# Patient Record
Sex: MALE | Race: BLACK OR AFRICAN AMERICAN | NOT HISPANIC OR LATINO | URBAN - METROPOLITAN AREA
[De-identification: names, ages, dates, MRNs, and addresses within clinical notes are randomized per-mention and may not be internally consistent; named-entity substitution may affect disease eponyms.]

---

## 2022-07-09 ENCOUNTER — HOSPITAL ENCOUNTER (EMERGENCY)
Facility: MEDICAL CENTER | Age: 42
End: 2022-07-09
Attending: EMERGENCY MEDICINE
Payer: COMMERCIAL

## 2022-07-09 VITALS
TEMPERATURE: 97.8 F | SYSTOLIC BLOOD PRESSURE: 158 MMHG | WEIGHT: 195.33 LBS | OXYGEN SATURATION: 99 % | BODY MASS INDEX: 29.6 KG/M2 | DIASTOLIC BLOOD PRESSURE: 92 MMHG | RESPIRATION RATE: 18 BRPM | HEART RATE: 66 BPM | HEIGHT: 68 IN

## 2022-07-09 DIAGNOSIS — R21 RASH: ICD-10-CM

## 2022-07-09 PROCEDURE — 700102 HCHG RX REV CODE 250 W/ 637 OVERRIDE(OP): Performed by: EMERGENCY MEDICINE

## 2022-07-09 PROCEDURE — 99283 EMERGENCY DEPT VISIT LOW MDM: CPT

## 2022-07-09 PROCEDURE — A9270 NON-COVERED ITEM OR SERVICE: HCPCS | Performed by: EMERGENCY MEDICINE

## 2022-07-09 RX ORDER — DIPHENHYDRAMINE HCL 25 MG
50 TABLET ORAL ONCE
Status: COMPLETED | OUTPATIENT
Start: 2022-07-09 | End: 2022-07-09

## 2022-07-09 RX ORDER — PERMETHRIN 50 MG/G
CREAM TOPICAL
Qty: 30 G | Refills: 0 | Status: SHIPPED | OUTPATIENT
Start: 2022-07-09

## 2022-07-09 RX ORDER — PERMETHRIN 50 MG/G
CREAM TOPICAL ONCE
Status: COMPLETED | OUTPATIENT
Start: 2022-07-09 | End: 2022-07-09

## 2022-07-09 RX ORDER — DIPHENHYDRAMINE HCL 25 MG
25 CAPSULE ORAL EVERY 6 HOURS PRN
Qty: 30 CAPSULE | Refills: 0 | Status: SHIPPED | OUTPATIENT
Start: 2022-07-09

## 2022-07-09 RX ADMIN — DIPHENHYDRAMINE HYDROCHLORIDE 50 MG: 25 TABLET ORAL at 17:26

## 2022-07-09 RX ADMIN — PERMETHRIN CREAM 5% W/W: 50 CREAM TOPICAL at 17:59

## 2022-07-09 NOTE — ED TRIAGE NOTES
"Chief Complaint   Patient presents with   • Skin Lesion     Just [place] an apt. \"[place]\" he said its not hygenic there and is concerned for bug bites/bed bugs. Pt states itching and swelling to bite area       "

## 2022-07-10 NOTE — DISCHARGE PLANNING
ER JYOTI met with Pt to offer low income housing resources.  Pt shared that he was staying in a low income apartment and due to an incident he had to leave abruptly. He stated that he is now staying in a weekly hotel, but is not planning on staying their long.  He shared that he has talked to the management at the hotel and they are talking about doing treatments to his room.      This SW provided Pt with the low income housing list.  Pt shared that he has already been to the [place] authority and has been approved for [facility].      Pt also shared that he goes to [facility] for his PCP care and will follow up with them.

## 2022-07-10 NOTE — ED PROVIDER NOTES
"ED Provider Note    CHIEF COMPLAINT  Chief Complaint   Patient presents with   • Skin Lesion     Just moved [place] an apt. \"[place]\" he said its not hygenic there and is concerned for bug bites/bed bugs. Pt states itching and swelling to bite area       HPI  Fam Jacob is a 42 y.o. male who presents with a chief complaint of itchy rash that has been ongoing for a little less than a week.  Patient moved into a local motel and 1 to 2 days after moving and developed an itchy rash all over his body.  He has not tried any medication for the symptoms.  The rash is so itchy he is having trouble sleeping.  He denies any fevers, difficulty breathing, nausea, vomiting.    REVIEW OF SYSTEMS  See HPI for further details.  Itchy rash.  All other systems are negative.     PAST MEDICAL HISTORY       SOCIAL HISTORY  Social History     Tobacco Use   • Smoking status: Not on file   • Smokeless tobacco: Not on file   Substance and Sexual Activity   • Alcohol use: Not on file   • Drug use: Not on file   • Sexual activity: Not on file       SURGICAL HISTORY  patient denies any surgical history    CURRENT MEDICATIONS  Home Medications    **Home medications have not yet been reviewed for this encounter**         ALLERGIES  No Known Allergies    PHYSICAL EXAM  VITAL SIGNS: BP (!) 149/95   Pulse (!) 59   Temp 36.8 °C (98.2 °F) (Temporal)   Resp 18   Ht 1.727 m (5' 8\")   Wt 88.6 kg (195 lb 5.2 oz)   SpO2 100%   BMI 29.70 kg/m²    Pulse ox interpretation: I interpret this pulse ox as normal.  Constitutional: Alert in no apparent distress.  HENT: Normocephalic, atraumatic, bilateral external ears normal. Mucous membranes moist. Nose normal.  No oral lesions.  Eyes: Pupils are equal and reactive. Conjunctiva normal, non-icteric.   Heart: Regular rate and rythm, no murmurs.    Lungs: Clear to auscultation bilaterally.  Skin: Warm, dry.  Raised, erythematous areas over torso and all 4 extremities without associated " fluctuance, vesicles, or drainage.  Neurologic: Alert, grossly non-focal.   Psychiatric: Affect normal, judgment normal, mood normal, appears appropriate and not intoxicated.     COURSE & MEDICAL DECISION MAKING  Pertinent Labs & Imaging studies reviewed. (See chart for details)  This is a 42-year-old gentleman currently living in a motel who is here with a pruritic rash over his entire body likely due to bedbugs or potentially scabies.  There is no worsening of the rash in the intertriginous regions and the rash is over the entire body.  No skin sloughing.  No mucosal lesions to suggest SJS/TN.  No difficulty breathing or wheezes.  No stridor.  He is tolerating secretions well.  This does not appear to be an allergic reaction.  Patient unable to purchase or pickup prescriptions so permethrin will be applied here. He was instructed to rinse it off in 14 hours. I have recommended benadryl for the itching. He was seen by social work to discuss housing options. Discharged in good and stable condition with strict return precautions.    The patient will return for worsening symptoms and is stable at the time of discharge. The patient verbalizes understanding and will comply.    FINAL IMPRESSION  1. Rash  permethrin (ELIMITE) 5 % Cream    diphenhydrAMINE (BENADRYL) 25 MG capsule       Electronically signed by: Kush Weathers M.D., 7/9/2022 5:14 PM

## 2022-07-10 NOTE — ED NOTES
Discharge instructions provided, pt verbalizes understanding and has no questions. Vital signs stable, pt ambulated out of ER with steady gate.      HTN (hypertension) Seizure

## 2022-07-10 NOTE — DISCHARGE INSTRUCTIONS
You were seen in the ER for a rash that could be due to bed bugs. I have given you a prescription for cream to put on to cure the rash. I also gave you a prescription for benadryl for the itching. Please take it as directed. You cannot drink alcohol or drive after taking the medication as it will make you sleepy. Please follow up with a primary care physician next week for recheck. Return with any new or worsening symptoms. I hope you feel better soon!

## 2022-10-29 ENCOUNTER — APPOINTMENT (OUTPATIENT)
Dept: RADIOLOGY | Facility: MEDICAL CENTER | Age: 42
End: 2022-10-29
Attending: EMERGENCY MEDICINE
Payer: COMMERCIAL

## 2022-10-29 ENCOUNTER — HOSPITAL ENCOUNTER (EMERGENCY)
Facility: MEDICAL CENTER | Age: 42
End: 2022-10-29
Attending: EMERGENCY MEDICINE
Payer: COMMERCIAL

## 2022-10-29 VITALS
HEART RATE: 78 BPM | BODY MASS INDEX: 29.77 KG/M2 | DIASTOLIC BLOOD PRESSURE: 92 MMHG | RESPIRATION RATE: 18 BRPM | OXYGEN SATURATION: 96 % | WEIGHT: 196.43 LBS | HEIGHT: 68 IN | TEMPERATURE: 98.4 F | SYSTOLIC BLOOD PRESSURE: 160 MMHG

## 2022-10-29 DIAGNOSIS — M25.512 ACUTE PAIN OF LEFT SHOULDER: ICD-10-CM

## 2022-10-29 PROCEDURE — 99283 EMERGENCY DEPT VISIT LOW MDM: CPT | Mod: 25

## 2022-10-29 PROCEDURE — 73030 X-RAY EXAM OF SHOULDER: CPT | Mod: LT

## 2022-10-29 PROCEDURE — 20610 DRAIN/INJ JOINT/BURSA W/O US: CPT

## 2022-10-29 RX ORDER — NAPROXEN 500 MG/1
500 TABLET ORAL 2 TIMES DAILY WITH MEALS
Qty: 60 TABLET | Refills: 0 | Status: SHIPPED | OUTPATIENT
Start: 2022-10-29 | End: 2022-11-03

## 2022-10-29 NOTE — ED NOTES
Pt awake, sitting upright in bed, speaking without signs of distress. States he was sleeping in chair when he awakened he had pain to his left shoulder. No bruising noted. No crepitis noted. No open wounds noted.

## 2022-10-29 NOTE — ED PROVIDER NOTES
ED Provider Note    CHIEF COMPLAINT  Chief Complaint   Patient presents with    Shoulder Pain     LT shoulder pain x3 weeks. No known injury. Reports he feels inflamed. Hurts worse after waking.      HPI  Patient is a 42-year-old male with a history of prior cataract surgery and ankle surgery who presents emergency room for chronic left-sided shoulder pain.  He says it is been going on over the course of a month that has been more exacerbated over the last several weeks while he has been his job.  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  He has not been seen he has previously and now presents with worsening pain and discomfort.  He has not had x-rays before.  He denies any traumatic injuries, he has not been having any numbness or tingling, denies any skin changes and has not noticed any weakness or other overall changes to the extremity.    PPE Note: I personally donned full PPE for all patient encounters during this visit, including being clean-shaven with an N95 respirator mask, gloves, and goggles.     REVIEW OF SYSTEMS  See HPI for further details. All other systems are negative.     PAST MEDICAL HISTORY       SOCIAL HISTORY  Social History     Tobacco Use    Smoking status: Former     Types: Cigarettes    Smokeless tobacco: Never   Substance and Sexual Activity    Alcohol use: Yes     Comment: occ    Drug use: Not Currently    Sexual activity: Not on file       SURGICAL HISTORY  patient denies any surgical history    CURRENT MEDICATIONS  Home Medications       Reviewed by Ankit Aguilar R.N. (Registered Nurse) on 10/29/22 at 1618  Med List Status: Partial     Medication Last Dose Status   diphenhydrAMINE (BENADRYL) 25 MG capsule  Active   permethrin (ELIMITE) 5 % Cream  Active                  ALLERGIES  No Known Allergies    PHYSICAL  "EXAM  VITAL SIGNS: BP (!) 160/92   Pulse 78   Temp 36.9 °C (98.4 °F) (Temporal)   Resp 18   Ht 1.727 m (5' 8\")   Wt 89.1 kg (196 lb 6.9 oz)   SpO2 96%   BMI 29.87 kg/m²   Pulse ox interpretation: I interpret this pulse ox as normal.  Genl: M sitting in chair comfortably, speaking clearly, appears in no acute distress   Head: NC/AT   ENT: Mucous membranes moist, posterior pharynx clear, uvula midline, nares patent bilaterally   Pulmonary: Lungs are clear to auscultation bilaterally  CV:  RRR, no murmur appreciated, pulses 2+ in both upper and lower extremities,  Abdomen: soft, NT/ND; no rebound/guarding  Musculoskeletal: Pain free ROM of the neck. Moving upper and lower extremities and spontaneous in coordinated fashion  Right Upper Extremity  - Skin: No abrasions, no lacerations, no ecchymosis.  Pain easily elicited with palpation of the supraspinatus, with palpation of the greater tuberosity of the proximal humerus  - Motor: Limited ROM at shoulder due to pain, Full elbow, wrist; 5/5 wrist flexion/extension, thumb IP joint flexion/extension (AIN/PIN), abduction/adduction (ulnar) all intact  - Sensation intact to median/ulnar/radial nerves  - 2+ radial pulse, < 2 sec cap refill x 5 digits    DIAGNOSTIC STUDIES / PROCEDURES    LABS  Labs Reviewed - No data to display    RADIOLOGY  DX-SHOULDER 2+ LEFT   Final Result      No fracture or dislocation of LEFT shoulder.        COURSE & MEDICAL DECISION MAKING  Pertinent Labs & Imaging studies reviewed. (See chart for details)    DDX:  Musculoskeletal strain, bursitis, fracture, subluxation, AC joint separation, overuse injury/capsulitis    MDM  Initial evaluation at 1632:  Patient is seen for symptoms as described above.  He does have some impediment onset with high likelihood of overuse injury versus 6 tear based on his areas of regional tenderness and manipulation.  Acutely patient has not had imaging and this was obtained and thankfully shows no evidence of acute " fracture, subluxation or AC joint separation.  And extensive discussion regarding the likely exacerbating factors and the need for ongoing anti-inflammatory medications.  He is amenable to doing a small joint injection with lidocaine for immediate pain relief I would recommend outpatient follow-up with orthopedics in a nonemergent manner    Arthrocentesis Procedure  Indication: Joint pain  Consent: The patient was counseled regarding the procedure, it's indications, risks, potential complications and alternatives and any questions were answered. Consent was obtained.  Procedure: The left shoulder was positioned appropriately and the landmarks were identified.  Local anesthesia was obtained by infiltration using 0.5% Bupivacaine with epinephrine.  The area was then prepped and draped in the usual sterile fashion.  A needle was then introduced into the joint space at which point No fluid was able to be aspirated.  A joint injection was performed using 4.0 cc of lidocaine/bupivicane.  A sterile dressing was then applied to the site.  The patient tolerated the procedure well.  Complications: None    FINAL IMPRESSION  Visit Diagnoses     ICD-10-CM   1. Acute pain of left shoulder  M25.512     Electronically signed by: Rolando Christopher M.D., 10/29/2022 4:32 PM

## 2022-10-29 NOTE — ED TRIAGE NOTES
"Chief Complaint   Patient presents with    Shoulder Pain     LT shoulder pain x3 weeks. No known injury. Reports he feels inflamed. Hurts worse after waking.      Ambulatory to triage for above. No distress noted. CMS intact.    BP (!) 148/91   Pulse 73   Temp 37.2 °C (98.9 °F) (Oral)   Resp 16   Ht 1.727 m (5' 8\")   Wt 89.1 kg (196 lb 6.9 oz)   SpO2 99%   BMI 29.87 kg/m²     "

## 2022-10-30 NOTE — ED NOTES
Pt awake, speaking without signs of distress. States understanding of discharge instructions. States understanding of sling instructions.

## 2023-02-14 ENCOUNTER — HOSPITAL ENCOUNTER (EMERGENCY)
Facility: MEDICAL CENTER | Age: 43
End: 2023-02-14
Attending: EMERGENCY MEDICINE
Payer: COMMERCIAL

## 2023-02-14 VITALS
SYSTOLIC BLOOD PRESSURE: 153 MMHG | OXYGEN SATURATION: 94 % | WEIGHT: 212.3 LBS | RESPIRATION RATE: 18 BRPM | DIASTOLIC BLOOD PRESSURE: 87 MMHG | TEMPERATURE: 98.4 F | HEART RATE: 82 BPM | HEIGHT: 68 IN | BODY MASS INDEX: 32.18 KG/M2

## 2023-02-14 DIAGNOSIS — M79.651 PAIN OF RIGHT THIGH: ICD-10-CM

## 2023-02-14 LAB
ALBUMIN SERPL BCP-MCNC: 4.5 G/DL (ref 3.2–4.9)
ALBUMIN/GLOB SERPL: 1.2 G/DL
ALP SERPL-CCNC: 62 U/L (ref 30–99)
ALT SERPL-CCNC: 19 U/L (ref 2–50)
ANION GAP SERPL CALC-SCNC: 11 MMOL/L (ref 7–16)
AST SERPL-CCNC: 27 U/L (ref 12–45)
BASOPHILS # BLD AUTO: 1 % (ref 0–1.8)
BASOPHILS # BLD: 0.06 K/UL (ref 0–0.12)
BILIRUB SERPL-MCNC: 0.5 MG/DL (ref 0.1–1.5)
BUN SERPL-MCNC: 13 MG/DL (ref 8–22)
CALCIUM ALBUM COR SERPL-MCNC: 9.1 MG/DL (ref 8.5–10.5)
CALCIUM SERPL-MCNC: 9.5 MG/DL (ref 8.5–10.5)
CHLORIDE SERPL-SCNC: 108 MMOL/L (ref 96–112)
CK SERPL-CCNC: 239 U/L (ref 0–154)
CO2 SERPL-SCNC: 25 MMOL/L (ref 20–33)
CREAT SERPL-MCNC: 1.01 MG/DL (ref 0.5–1.4)
CRP SERPL HS-MCNC: <0.3 MG/DL (ref 0–0.75)
EOSINOPHIL # BLD AUTO: 0.32 K/UL (ref 0–0.51)
EOSINOPHIL NFR BLD: 5.2 % (ref 0–6.9)
ERYTHROCYTE [DISTWIDTH] IN BLOOD BY AUTOMATED COUNT: 46.7 FL (ref 35.9–50)
GFR SERPLBLD CREATININE-BSD FMLA CKD-EPI: 95 ML/MIN/1.73 M 2
GLOBULIN SER CALC-MCNC: 3.8 G/DL (ref 1.9–3.5)
GLUCOSE SERPL-MCNC: 105 MG/DL (ref 65–99)
HCT VFR BLD AUTO: 48.7 % (ref 42–52)
HGB BLD-MCNC: 15.9 G/DL (ref 14–18)
IMM GRANULOCYTES # BLD AUTO: 0.02 K/UL (ref 0–0.11)
IMM GRANULOCYTES NFR BLD AUTO: 0.3 % (ref 0–0.9)
LYMPHOCYTES # BLD AUTO: 1.12 K/UL (ref 1–4.8)
LYMPHOCYTES NFR BLD: 18.1 % (ref 22–41)
MCH RBC QN AUTO: 28.3 PG (ref 27–33)
MCHC RBC AUTO-ENTMCNC: 32.6 G/DL (ref 33.7–35.3)
MCV RBC AUTO: 86.7 FL (ref 81.4–97.8)
MONOCYTES # BLD AUTO: 0.41 K/UL (ref 0–0.85)
MONOCYTES NFR BLD AUTO: 6.6 % (ref 0–13.4)
NEUTROPHILS # BLD AUTO: 4.26 K/UL (ref 1.82–7.42)
NEUTROPHILS NFR BLD: 68.8 % (ref 44–72)
NRBC # BLD AUTO: 0 K/UL
NRBC BLD-RTO: 0 /100 WBC
PLATELET # BLD AUTO: 181 K/UL (ref 164–446)
PMV BLD AUTO: 10.2 FL (ref 9–12.9)
POTASSIUM SERPL-SCNC: 4.2 MMOL/L (ref 3.6–5.5)
PROT SERPL-MCNC: 8.3 G/DL (ref 6–8.2)
RBC # BLD AUTO: 5.62 M/UL (ref 4.7–6.1)
SODIUM SERPL-SCNC: 144 MMOL/L (ref 135–145)
WBC # BLD AUTO: 6.2 K/UL (ref 4.8–10.8)

## 2023-02-14 PROCEDURE — 82550 ASSAY OF CK (CPK): CPT

## 2023-02-14 PROCEDURE — 99283 EMERGENCY DEPT VISIT LOW MDM: CPT

## 2023-02-14 PROCEDURE — 85025 COMPLETE CBC W/AUTO DIFF WBC: CPT

## 2023-02-14 PROCEDURE — 86140 C-REACTIVE PROTEIN: CPT

## 2023-02-14 PROCEDURE — 80053 COMPREHEN METABOLIC PANEL: CPT

## 2023-02-14 PROCEDURE — 36415 COLL VENOUS BLD VENIPUNCTURE: CPT

## 2023-02-14 NOTE — ED TRIAGE NOTES
Ambulates to triage with a limp  Chief Complaint   Patient presents with    Leg Pain     R upper leg outer thigh x1 month, pain, redness and swelling. Pt said he woke up one morning with the swelling     Denies any trauma, has been taking advil for the pain.

## 2023-02-15 NOTE — DISCHARGE INSTRUCTIONS
2 tablets of Motrin and then 4 hours later 2 tablets of Tylenol and stay on this regimen for the next 24 to 48 hours.  Stay well-hydrated.    Your blood pressure is elevated in the emergency department and this needs to be followed up in 10 to 14 days with a primary care provider.

## 2023-02-15 NOTE — ED PROVIDER NOTES
"ED Provider Note    CHIEF COMPLAINT  Chief Complaint   Patient presents with    Leg Pain     R upper leg outer thigh x1 month, pain, redness and swelling. Pt said he woke up one morning with the swelling         HPI/ROS    Fam Jacob is a 43 y.o. male who presents with right thigh pain.  The patient states that he had pain to his right upper and lateral aspect of his thigh over the last month.  He does not remember any direct trauma.  He states that it feels swollen.  He denies IV drug abuse.  He has not had any associated fevers.  He has not had any vomiting or diarrhea.  He does not have any functional loss of the right lower extremity.    PAST MEDICAL HISTORY       SURGICAL HISTORY   has a past surgical history that includes cataract extraction with iol and hernia repair.    FAMILY HISTORY  History reviewed. No pertinent family history.    SOCIAL HISTORY  Social History     Tobacco Use    Smoking status: Former     Types: Cigarettes    Smokeless tobacco: Never   Vaping Use    Vaping Use: Never used   Substance and Sexual Activity    Alcohol use: Yes     Comment: occ    Drug use: Not Currently    Sexual activity: Not on file       CURRENT MEDICATIONS  Home Medications       Reviewed by Lynnette Alcala R.N. (Registered Nurse) on 02/14/23 at 1510  Med List Status: Partial     Medication Last Dose Status   diphenhydrAMINE (BENADRYL) 25 MG capsule  Active   permethrin (ELIMITE) 5 % Cream  Active                    ALLERGIES  No Known Allergies    PHYSICAL EXAM  VITAL SIGNS: BP (!) 164/92   Pulse 95   Temp 35.9 °C (96.7 °F) (Temporal)   Resp 16   Ht 1.727 m (5' 8\")   Wt 96.3 kg (212 lb 4.9 oz)   SpO2 96%   BMI 32.28 kg/m²    General the patient does not appear toxic    Extremities patient does not have any scale deformities to the right lower extremity.  Does have some pain along the lateral aspect of the femur as well as anteriorly but the compartments are soft.  He has good distal pulses to the " bilateral lower extremities.    Skin no erythema nor induration appreciated    Neurovascular semination is grossly intact to the right lower extremity      LABS  Results for orders placed or performed during the hospital encounter of 02/14/23   CBC WITH DIFFERENTIAL   Result Value Ref Range    WBC 6.2 4.8 - 10.8 K/uL    RBC 5.62 4.70 - 6.10 M/uL    Hemoglobin 15.9 14.0 - 18.0 g/dL    Hematocrit 48.7 42.0 - 52.0 %    MCV 86.7 81.4 - 97.8 fL    MCH 28.3 27.0 - 33.0 pg    MCHC 32.6 (L) 33.7 - 35.3 g/dL    RDW 46.7 35.9 - 50.0 fL    Platelet Count 181 164 - 446 K/uL    MPV 10.2 9.0 - 12.9 fL    Neutrophils-Polys 68.80 44.00 - 72.00 %    Lymphocytes 18.10 (L) 22.00 - 41.00 %    Monocytes 6.60 0.00 - 13.40 %    Eosinophils 5.20 0.00 - 6.90 %    Basophils 1.00 0.00 - 1.80 %    Immature Granulocytes 0.30 0.00 - 0.90 %    Nucleated RBC 0.00 /100 WBC    Neutrophils (Absolute) 4.26 1.82 - 7.42 K/uL    Lymphs (Absolute) 1.12 1.00 - 4.80 K/uL    Monos (Absolute) 0.41 0.00 - 0.85 K/uL    Eos (Absolute) 0.32 0.00 - 0.51 K/uL    Baso (Absolute) 0.06 0.00 - 0.12 K/uL    Immature Granulocytes (abs) 0.02 0.00 - 0.11 K/uL    NRBC (Absolute) 0.00 K/uL   Comp Metabolic Panel   Result Value Ref Range    Sodium 144 135 - 145 mmol/L    Potassium 4.2 3.6 - 5.5 mmol/L    Chloride 108 96 - 112 mmol/L    Co2 25 20 - 33 mmol/L    Anion Gap 11.0 7.0 - 16.0    Glucose 105 (H) 65 - 99 mg/dL    Bun 13 8 - 22 mg/dL    Creatinine 1.01 0.50 - 1.40 mg/dL    Calcium 9.5 8.5 - 10.5 mg/dL    AST(SGOT) 27 12 - 45 U/L    ALT(SGPT) 19 2 - 50 U/L    Alkaline Phosphatase 62 30 - 99 U/L    Total Bilirubin 0.5 0.1 - 1.5 mg/dL    Albumin 4.5 3.2 - 4.9 g/dL    Total Protein 8.3 (H) 6.0 - 8.2 g/dL    Globulin 3.8 (H) 1.9 - 3.5 g/dL    A-G Ratio 1.2 g/dL   CRP QUANTITIVE (NON-CARDIAC)   Result Value Ref Range    Stat C-Reactive Protein <0.30 0.00 - 0.75 mg/dL   CREATINE KINASE   Result Value Ref Range    CPK Total 239 (H) 0 - 154 U/L   CORRECTED CALCIUM   Result  Value Ref Range    Correct Calcium 9.1 8.5 - 10.5 mg/dL   ESTIMATED GFR   Result Value Ref Range    GFR (CKD-EPI) 95 >60 mL/min/1.73 m 2         COURSE & MEDICAL DECISION MAKING  This a 43-year-old gentleman who presents the emergency department with pain to the lateral anterior aspect of his right thigh.  I do not have a clear source.  Clinically do not appreciate any evidence of an infectious etiology.  He does not have any asymmetry and swelling to support a DVT.  He is has good distal pulses therefore an arterial insufficiency etiology would be unlikely.  The patient's CRP is less than 0.3 which is comforting he does not have a leukocytosis.  He does have a slight elevation of his total CK consistent with some myositis.  This could be from a viral source.  He has not had any excessive exercise.  The patient does not appear toxic and will attempt outpatient management with oral hydration and anti-inflammatories.  I would like the patient to recheck in 48 hours if he is not better.  He is aware his blood pressure is elevated and this needs to be rechecked on an outpatient basis in 7 to 10 days.  FINAL DIAGNOSIS  1.  Right thigh pain  2.  Hypertension    Disposition  The patient will be discharged in stable condition       Electronically signed by: Nilesh Myers M.D., 2/14/2023 4:26 PM

## 2024-06-14 ENCOUNTER — HOSPITAL ENCOUNTER (EMERGENCY)
Facility: MEDICAL CENTER | Age: 44
End: 2024-06-14
Attending: STUDENT IN AN ORGANIZED HEALTH CARE EDUCATION/TRAINING PROGRAM
Payer: COMMERCIAL

## 2024-06-14 ENCOUNTER — APPOINTMENT (OUTPATIENT)
Dept: RADIOLOGY | Facility: MEDICAL CENTER | Age: 44
End: 2024-06-14
Attending: STUDENT IN AN ORGANIZED HEALTH CARE EDUCATION/TRAINING PROGRAM
Payer: COMMERCIAL

## 2024-06-14 VITALS
HEART RATE: 78 BPM | SYSTOLIC BLOOD PRESSURE: 148 MMHG | BODY MASS INDEX: 31.27 KG/M2 | WEIGHT: 206.35 LBS | DIASTOLIC BLOOD PRESSURE: 77 MMHG | RESPIRATION RATE: 18 BRPM | TEMPERATURE: 98 F | OXYGEN SATURATION: 98 % | HEIGHT: 68 IN

## 2024-06-14 DIAGNOSIS — M25.561 ACUTE PAIN OF RIGHT KNEE: ICD-10-CM

## 2024-06-14 PROCEDURE — 73562 X-RAY EXAM OF KNEE 3: CPT | Mod: RT

## 2024-06-14 PROCEDURE — 700102 HCHG RX REV CODE 250 W/ 637 OVERRIDE(OP): Mod: UD | Performed by: STUDENT IN AN ORGANIZED HEALTH CARE EDUCATION/TRAINING PROGRAM

## 2024-06-14 PROCEDURE — 99283 EMERGENCY DEPT VISIT LOW MDM: CPT | Mod: EDC

## 2024-06-14 PROCEDURE — A9270 NON-COVERED ITEM OR SERVICE: HCPCS | Mod: UD | Performed by: STUDENT IN AN ORGANIZED HEALTH CARE EDUCATION/TRAINING PROGRAM

## 2024-06-14 RX ORDER — ACETAMINOPHEN 325 MG/1
650 TABLET ORAL ONCE
Status: COMPLETED | OUTPATIENT
Start: 2024-06-14 | End: 2024-06-14

## 2024-06-14 RX ORDER — IBUPROFEN 600 MG/1
600 TABLET ORAL ONCE
Status: COMPLETED | OUTPATIENT
Start: 2024-06-14 | End: 2024-06-14

## 2024-06-14 RX ADMIN — IBUPROFEN 600 MG: 600 TABLET, FILM COATED ORAL at 18:20

## 2024-06-14 RX ADMIN — ACETAMINOPHEN 650 MG: 325 TABLET, FILM COATED ORAL at 18:20

## 2024-06-14 ASSESSMENT — FIBROSIS 4 INDEX: FIB4 SCORE: 1.47

## 2024-06-14 ASSESSMENT — PAIN DESCRIPTION - PAIN TYPE: TYPE: ACUTE PAIN

## 2024-06-15 NOTE — DISCHARGE INSTRUCTIONS
You are seen in the emergency department for right-sided knee pain.  You likely have a meniscus injury potentially an injury to your medial collateral ligament.    Please take Tylenol ibuprofen for the next several days.  You can continue to weight-bear on your right leg.  Please call the number below to schedule an appointment with orthopedic surgery    [facility]  [address] [place] [phone]

## 2024-06-15 NOTE — ED PROVIDER NOTES
"ED Provider Note    CHIEF COMPLAINT  Chief Complaint   Patient presents with    Knee Pain     C/o right medial knee pain and swelling x 1 week.  Denies known injury.         EXTERNAL RECORDS REVIEWED  No external notes available    HPI/ROS  LIMITATION TO HISTORY   Select: : None  OUTSIDE HISTORIAN(S):  none    Famlorraine Jacob is a 44 y.o. male with no past medical history presented to the emergency department for right-sided knee pain.  Patient says that symptoms have been ongoing for last 2 weeks.  Says that he was helping lift heavy boxes for a friend that was moving, thinks he might of twisted his knee 2 weeks ago.  Since then has been taking Tylenol and applying IcyHot.  Pain is on the medial aspect of the knee.  No prior knee injury.  He denies any trauma to the knee.  Did not notice any popping or severe pain or any specific inciting event.  No other injuries    PAST MEDICAL HISTORY   has a past medical history of Patient denies medical problems.    SURGICAL HISTORY   has a past surgical history that includes cataract extraction with iol and hernia repair.    FAMILY HISTORY  History reviewed. No pertinent family history.    SOCIAL HISTORY  Social History     Tobacco Use    Smoking status: Former     Types: Cigarettes    Smokeless tobacco: Never   Vaping Use    Vaping status: Never Used   Substance and Sexual Activity    Alcohol use: Not Currently     Comment: occ    Drug use: Not Currently    Sexual activity: Not on file       CURRENT MEDICATIONS  Home Medications       Reviewed by Petty Medina R.N. (Registered Nurse) on 06/14/24 at 1734  Med List Status: Not Addressed     Medication Last Dose Status   diphenhydrAMINE (BENADRYL) 25 MG capsule  Active   permethrin (ELIMITE) 5 % Cream  Active                    ALLERGIES  No Known Allergies    PHYSICAL EXAM  VITAL SIGNS: BP (!) 148/77   Pulse 78   Temp 36.7 °C (98 °F) (Temporal)   Resp 18   Ht 1.727 m (5' 8\")   Wt 93.6 kg (206 lb 5.6 oz)   SpO2 " 98%   BMI 31.38 kg/m²    General: Well- appearing , non-toxic, no acute distress  Neuro: oriented x 3, moving all extremities.   HEENT:   - Head: Normocephalic, atraumatic  - Eyes: PERRL  - Ears/Nose: normal external nose and ears  - Mouth: Normal external exam  Resp: no increased work of breathing  CV: Perfusing well  Abd: Not vomiting, no apparent abdominal discomfort  Extremities:   Right lower extremity: Tenderness palpation along the medial joint line of the right knee.  He has no effusion.  His knee is stable on anterior posterior drawer, varus and valgus stress testing.  2+ PT pulse.  Sensation tact light touch.  Dorsiflexion plantarflexion of the great toe, foot about the ankle remains intact.  No overlying erythema induration about the knee.  Skin: Not diaphoretic  Psych: lucid and conversational         DIAGNOSTIC STUDIES / PROCEDURES    EKG  My independent EKG interpretation:  No results found for this or any previous visit.    LABS  Results for orders placed or performed during the hospital encounter of 02/14/23   CBC WITH DIFFERENTIAL   Result Value Ref Range    WBC 6.2 4.8 - 10.8 K/uL    RBC 5.62 4.70 - 6.10 M/uL    Hemoglobin 15.9 14.0 - 18.0 g/dL    Hematocrit 48.7 42.0 - 52.0 %    MCV 86.7 81.4 - 97.8 fL    MCH 28.3 27.0 - 33.0 pg    MCHC 32.6 (L) 33.7 - 35.3 g/dL    RDW 46.7 35.9 - 50.0 fL    Platelet Count 181 164 - 446 K/uL    MPV 10.2 9.0 - 12.9 fL    Neutrophils-Polys 68.80 44.00 - 72.00 %    Lymphocytes 18.10 (L) 22.00 - 41.00 %    Monocytes 6.60 0.00 - 13.40 %    Eosinophils 5.20 0.00 - 6.90 %    Basophils 1.00 0.00 - 1.80 %    Immature Granulocytes 0.30 0.00 - 0.90 %    Nucleated RBC 0.00 /100 WBC    Neutrophils (Absolute) 4.26 1.82 - 7.42 K/uL    Lymphs (Absolute) 1.12 1.00 - 4.80 K/uL    Monos (Absolute) 0.41 0.00 - 0.85 K/uL    Eos (Absolute) 0.32 0.00 - 0.51 K/uL    Baso (Absolute) 0.06 0.00 - 0.12 K/uL    Immature Granulocytes (abs) 0.02 0.00 - 0.11 K/uL    NRBC (Absolute) 0.00 K/uL    Comp Metabolic Panel   Result Value Ref Range    Sodium 144 135 - 145 mmol/L    Potassium 4.2 3.6 - 5.5 mmol/L    Chloride 108 96 - 112 mmol/L    Co2 25 20 - 33 mmol/L    Anion Gap 11.0 7.0 - 16.0    Glucose 105 (H) 65 - 99 mg/dL    Bun 13 8 - 22 mg/dL    Creatinine 1.01 0.50 - 1.40 mg/dL    Calcium 9.5 8.5 - 10.5 mg/dL    AST(SGOT) 27 12 - 45 U/L    ALT(SGPT) 19 2 - 50 U/L    Alkaline Phosphatase 62 30 - 99 U/L    Total Bilirubin 0.5 0.1 - 1.5 mg/dL    Albumin 4.5 3.2 - 4.9 g/dL    Total Protein 8.3 (H) 6.0 - 8.2 g/dL    Globulin 3.8 (H) 1.9 - 3.5 g/dL    A-G Ratio 1.2 g/dL   CRP QUANTITIVE (NON-CARDIAC)   Result Value Ref Range    Stat C-Reactive Protein <0.30 0.00 - 0.75 mg/dL   CREATINE KINASE   Result Value Ref Range    CPK Total 239 (H) 0 - 154 U/L   CORRECTED CALCIUM   Result Value Ref Range    Correct Calcium 9.1 8.5 - 10.5 mg/dL   ESTIMATED GFR   Result Value Ref Range    GFR (CKD-EPI) 95 >60 mL/min/1.73 m 2       RADIOLOGY  I have independently interpreted the diagnostic imaging associated with this visit and am waiting the final reading from the radiologist.   My preliminary interpretation is as follows:   -   Radiologist interpretation:   DX-KNEE 3 VIEWS RIGHT   Final Result         1.  No radiographic evidence of acute injury.              MEDICAL DECISION MAKING      ED COURSE AND PLAN    Fam Jacob is a 44 y.o. male presenting to the emergency department for right-sided medial knee pain.  No inciting trauma to the knee but does feel like he might of twisted his knee while helping a friend move several weeks ago.  His knee is stable to varus valgus stress, anterior posterior drawer.  He is neuro vastly intact.  Plain film of the knee shows no evidence of acute fracture.  There is a small amount of ossification below the patella felt by radiology consistent with dystrophic ossification.  Symptoms likely consistent with a medial meniscus injury possible MCL injury  I advised patient to follow-up  with orthopedic surgery for medial knee pain, PT possible MRI of the left knee.  Appropriate for discharge return precautions discussed.      ---Pertinent ED Course---:    6:11 PM I reviewed the patient's old records in Epic, medication list, allergies, past medical history and performed a physical examination.         Procedures:      ----------------------------------------------------------------------------------  DISCUSSIONS    I have discussed management of the patient with the following physicians and ORESTES's:      Discussion of management with other [facility] or appropriate source(s):     Escalation of care considered, and ultimately not performed: Considered but no indication for consultation with orthopedic surgery    Barriers to care at this time, including but not limited to:     Decision tools and prescription drugs considered including, but not limited to:     FINAL IMPRESSION    1. Acute pain of right knee        Discharge Medication List as of 6/14/2024  6:53 PM            DISPOSITION    Discharge home, Stable        This chart was dictated using an electronic voice recognition software. The chart has been reviewed and edited but there is still possibility for dictation errors due to limitation of software.    Adam Nicole,  6/14/2024

## 2024-06-15 NOTE — ED TRIAGE NOTES
Chief Complaint   Patient presents with    Knee Pain     C/o right medial knee pain and swelling x 1 week.  Denies known injury.       Ambulated into triage with steady gait for above.

## 2024-06-15 NOTE — ED NOTES
Medicated pt per MAR  
Pt ambulated to room from lobby.  Agree with triage note. Chart up for ERP. Call light in reach.   
Pt provided discharge instructions. Pt verbalized understanding. Pt leaving ER in stable condition, pt ambulatory with steady gait.  
Xray at bedside  
Xray at bedside    
Add 52 Modifier (Optional): no
Number Of Freeze-Thaw Cycles: 2 freeze-thaw cycles
Duration Of Freeze Thaw-Cycle (Seconds): 5-10
Post-Care Instructions: I reviewed with the patient in detail post-care instructions. Patient is to wear sunprotection, and avoid picking at any of the treated lesions. Pt may apply Vaseline to crusted or scabbing areas.
Consent: The patient's consent was obtained including but not limited to risks of crusting, scabbing, blistering, scarring, darker or lighter pigmentary change, recurrence, incomplete removal and infection.
Medical Necessity Information: It is in your best interest to select a reason for this procedure from the list below. All of these items fulfill various CMS LCD requirements except the new and changing color options.
Detail Level: Simple
Medical Necessity Clause: This procedure was medically necessary because the lesions that were treated were: